# Patient Record
Sex: FEMALE | Race: WHITE | Employment: FULL TIME | URBAN - METROPOLITAN AREA
[De-identification: names, ages, dates, MRNs, and addresses within clinical notes are randomized per-mention and may not be internally consistent; named-entity substitution may affect disease eponyms.]

---

## 2024-04-16 PROCEDURE — 88304 TISSUE EXAM BY PATHOLOGIST: CPT | Performed by: PATHOLOGY

## 2024-04-16 PROCEDURE — 88304 TISSUE EXAM BY PATHOLOGIST: CPT

## 2024-04-17 ENCOUNTER — LAB REQUISITION (OUTPATIENT)
Dept: LAB | Facility: HOSPITAL | Age: 69
End: 2024-04-17
Payer: MEDICARE

## 2024-04-17 DIAGNOSIS — R22.32 LOCALIZED SWELLING, MASS AND LUMP, LEFT UPPER LIMB: ICD-10-CM

## 2024-04-18 LAB
LABORATORY COMMENT REPORT: NORMAL
PATH REPORT.FINAL DX SPEC: NORMAL
PATH REPORT.GROSS SPEC: NORMAL
PATH REPORT.RELEVANT HX SPEC: NORMAL
PATH REPORT.TOTAL CANCER: NORMAL

## 2025-02-02 ENCOUNTER — OFFICE VISIT (OUTPATIENT)
Dept: URGENT CARE | Facility: URGENT CARE | Age: 70
End: 2025-02-02
Payer: MEDICARE

## 2025-02-02 VITALS
SYSTOLIC BLOOD PRESSURE: 121 MMHG | TEMPERATURE: 98.4 F | RESPIRATION RATE: 16 BRPM | WEIGHT: 132.28 LBS | OXYGEN SATURATION: 95 % | BODY MASS INDEX: 24.22 KG/M2 | DIASTOLIC BLOOD PRESSURE: 76 MMHG | HEART RATE: 80 BPM

## 2025-02-02 DIAGNOSIS — J06.9 VIRAL URI WITH COUGH: Primary | ICD-10-CM

## 2025-02-02 DIAGNOSIS — J32.9 VIRAL SINUSITIS: ICD-10-CM

## 2025-02-02 DIAGNOSIS — H61.21 IMPACTED CERUMEN OF RIGHT EAR: ICD-10-CM

## 2025-02-02 DIAGNOSIS — R05.1 ACUTE COUGH: ICD-10-CM

## 2025-02-02 DIAGNOSIS — B97.89 VIRAL SINUSITIS: ICD-10-CM

## 2025-02-02 LAB
POC RAPID INFLUENZA A: NEGATIVE
POC RAPID INFLUENZA B: NEGATIVE
POC SARS-COV-2 AG BINAX: NORMAL

## 2025-02-02 PROCEDURE — 87811 SARS-COV-2 COVID19 W/OPTIC: CPT | Performed by: NURSE PRACTITIONER

## 2025-02-02 PROCEDURE — 99203 OFFICE O/P NEW LOW 30 MIN: CPT | Performed by: NURSE PRACTITIONER

## 2025-02-02 PROCEDURE — 1159F MED LIST DOCD IN RCRD: CPT | Performed by: NURSE PRACTITIONER

## 2025-02-02 PROCEDURE — 69209 REMOVE IMPACTED EAR WAX UNI: CPT | Performed by: NURSE PRACTITIONER

## 2025-02-02 PROCEDURE — 87804 INFLUENZA ASSAY W/OPTIC: CPT | Performed by: NURSE PRACTITIONER

## 2025-02-02 RX ORDER — FLUTICASONE PROPIONATE 50 MCG
1 SPRAY, SUSPENSION (ML) NASAL DAILY
Qty: 16 G | Refills: 0 | Status: SHIPPED | OUTPATIENT
Start: 2025-02-02 | End: 2025-02-16

## 2025-02-02 ASSESSMENT — ENCOUNTER SYMPTOMS
TROUBLE SWALLOWING: 1
FREQUENCY: 0
CHILLS: 0
DIFFICULTY URINATING: 0
ABDOMINAL PAIN: 0
DYSURIA: 0
APPETITE CHANGE: 0
VOMITING: 0
NECK STIFFNESS: 0
SHORTNESS OF BREATH: 0
WHEEZING: 0
LIGHT-HEADEDNESS: 0
MYALGIAS: 0
DIZZINESS: 0
SINUS PAIN: 0
CONFUSION: 0
COLOR CHANGE: 0
NAUSEA: 0
DIARRHEA: 0
SORE THROAT: 0
FEVER: 0
PALPITATIONS: 0
HEADACHES: 1
WEAKNESS: 0
COUGH: 1
WOUND: 0
NECK PAIN: 0
BACK PAIN: 0
CHEST TIGHTNESS: 0
SINUS PRESSURE: 0
DIAPHORESIS: 0
ACTIVITY CHANGE: 0
RHINORRHEA: 1
FATIGUE: 0

## 2025-02-02 NOTE — PROGRESS NOTES
Subjective   Patient ID: Tessy Catalan is a 69 y.o. female. They present today with a chief complaint of Cough (Coughing up a lot of green mucus cough is getting better 4 days ) and Headache (Back of head hurting pt states this happens when she gets a sinus infection 4 days ).    History of Present Illness  Chief complaint: nasal congestion/cough/trouble hearing right ear. Mild sinus HA      HPI: onset above sx 4 days. + exposure to  with +FLU A. No NVD/CP/SOB/ normal I+O. Not vaccinated for flu/covid. Drinking fluids well. Nothing for sx PTA. No fevers.   Nurses notes, vitals and old chart reviewed      History provided by:  Patient   used: No    Cough  Associated symptoms include headaches and rhinorrhea. Pertinent negatives include no chest pain, chills, ear pain, fever, myalgias, sore throat, shortness of breath or wheezing.   Headache  Associated symptoms: congestion and cough    Associated symptoms: no abdominal pain, no back pain, no diarrhea, no dizziness, no ear pain, no fatigue, no fever, no myalgias, no nausea, no neck pain, no neck stiffness, no sinus pressure, no sore throat, no vomiting and no weakness        Past Medical History  Allergies as of 02/02/2025    (No Known Allergies)       (Not in a hospital admission)       No past medical history on file.    No past surgical history on file.     reports that she has quit smoking. Her smoking use included cigarettes. She uses smokeless tobacco.    Review of Systems  Review of Systems   Constitutional:  Negative for activity change, appetite change, chills, diaphoresis, fatigue and fever.   HENT:  Positive for congestion, rhinorrhea and trouble swallowing. Negative for drooling, ear discharge, ear pain, nosebleeds, sinus pressure, sinus pain, sneezing and sore throat.         Decreae hearing right ear.    Eyes:  Negative for visual disturbance.   Respiratory:  Positive for cough. Negative for chest tightness, shortness of breath  and wheezing.    Cardiovascular:  Negative for chest pain, palpitations and leg swelling.   Gastrointestinal:  Negative for abdominal pain, diarrhea, nausea and vomiting.   Genitourinary:  Negative for decreased urine volume, difficulty urinating, dysuria, frequency and urgency.   Musculoskeletal:  Negative for back pain, myalgias, neck pain and neck stiffness.   Skin:  Negative for color change and wound.   Neurological:  Positive for headaches. Negative for dizziness, syncope, weakness and light-headedness.   Psychiatric/Behavioral:  Negative for confusion.                                   Objective    Vitals:    02/02/25 1009   BP: 121/76   BP Location: Left arm   Patient Position: Sitting   Pulse: 80   Resp: 16   Temp: 36.9 °C (98.4 °F)   TempSrc: Oral   SpO2: 95%   Weight: 60 kg (132 lb 4.4 oz)     No LMP recorded.    Physical Exam  Physical Exam  Constitutional:       General: Pateint is awake. Patient is not in acute distress.     Appearance: Normal appearance. Pateint is well-developed and well-groomed. Patient is not ill-appearing, toxic-appearing or diaphoretic.   HENT:      Head: Normocephalic and atraumatic. No right periorbital erythema or left periorbital erythema.      Jaw: There is normal jaw occlusion.      Right Ear: positve cerumen impaction. Hearing, tympanic membrane, ear canal and external ear normal. No tenderness. No mastoid tenderness.      Left Ear: Hearing, tympanic membrane, ear canal and external ear normal. No tenderness. No mastoid tenderness.      Nose: Congestion and rhinorrhea are present. Negative for frontal, ethmoid, and maxillary sinus tenderness, erythema or edema. Neg periorbital edema or erythema.      Comments:      Mouth/Throat:      Lips: Pink.      Mouth: Mucous membranes are moist. No angioedema. No sublingual or lingual edema.      Pharynx: Oropharynx is clear. Uvula midline without uvulitis. No erythema, loculated or confluent exudate. No angioedema. No PTA formation.  No petechial rash  Eyes:   PERRLA, EOMS intact     Conjunctiva/sclera: Conjunctivae normal.   Lymphatics: Neg for submandibular, submental. Neg ant or post cervical.   Cardiovascular:      Rate and Rhythm: Normal rate.  RRR.      Pulses: Normal pulses.      Heart sounds: Normal heart sounds. No MGR.  Pulmonary:      Effort: Pulmonary effort is normal.      Breath sounds: Normal breath sounds and air entry.   Musculoskeletal:   VELAZQUEZ without deficits.      Right lower leg: No edema.      Left lower leg: No edema.   NECK: NEG JVD. Full ROM with flex/ext and lateral movements.   Skin:     Capillary Refill: Capillary refill takes less than 2 seconds.   Neurological:      Mental Status: Aox4, following all commands and answering all questions briskly and appropriately.      GCS: GCS eye subscore is 4. GCS verbal subscore is 5. GCS motor subscore is 6.   Psychiatric:         Mood and Affect: Mood normal.         Behavior: Behavior normal. Behavior is cooperative.         Thought Content: Thought content normal.         Judgment: Judgment normal.   Ear Cerumen Removal    Date/Time: 2/2/2025 10:47 AM    Performed by: SAFIA Fishman  Authorized by: SAFIA Fishman    Consent:     Consent obtained:  Verbal    Consent given by:  Patient    Risks, benefits, and alternatives were discussed: yes      Risks discussed:  Bleeding, dizziness, pain, TM perforation and incomplete removal    Alternatives discussed:  Referral and no treatment  Universal protocol:     Procedure explained and questions answered to patient or proxy's satisfaction: yes      Patient identity confirmed:  Verbally with patient  Procedure details:     Location:  R ear    Procedure type: irrigation      Procedure outcomes: cerumen removed    Post-procedure details:     Inspection:  No bleeding and ear canal clear    Hearing quality:  Improved    Procedure completion:  Tolerated well, no immediate complications      Point of Care Test & Imaging  Results from this visit  Results for orders placed or performed in visit on 02/02/25   POCT Covid-19 Rapid Antigen   Result Value Ref Range    POC OBEY-COV-2 AG  Presumptive negative test for SARS-CoV-2 (no antigen detected)     Presumptive negative test for SARS-CoV-2 (no antigen detected)   POCT Influenza A/B manually resulted   Result Value Ref Range    POC Rapid Influenza A Negative Negative    POC Rapid Influenza B Negative Negative      No results found.    Diagnostic study results (if any) were reviewed by SAFIA Fishman.    Assessment/Plan   Allergies, medications, history, and pertinent labs/EKGs/Imaging reviewed by SAFIA Fishman.     Medical Decision Making  HPI:  HISTORIAN: Pateint  INDEPENDENT HISTORIAN:   RECORDS REVIEWED:  DIFFERENTIAL DX: flu/covid/viral sinusitus/viral uri/cerumen impaction vs oe vs om  LABS: all labs neg.   XRAY: pox normal with clear lungs, no films needed.   EKG:  IN CLINIC MEDICATIONS: NONE  CONSULTED WITH:  DIAGNOSIS: See urgent care course of treatment  SEE URGENT CARE COURSE OF TREATMENT AND DOCUMENTATION FOR RX MEDS GIVEN.   Ears irrigated- see procedure note.   Patient and or family were counselled on labs, radiological studies, and all testing applicable for this visit as well as diagnosis. Patient was discharged homewith stable afebrile non-toxic vital signs. They verbalized discharge instructions that were given to them BOTH written and verbally by myself.  They were given ample time to ask questionsand have none at time of disposition.    Orders and Diagnoses  Diagnoses and all orders for this visit:  Viral URI with cough  Acute cough  -     POCT Covid-19 Rapid Antigen  -     POCT Influenza A/B manually resulted  Impacted cerumen of right ear  Viral sinusitis      Medical Admin Record      Patient disposition: Home    Electronically signed by SAFIA Fishman  10:38 AM

## 2025-02-02 NOTE — PATIENT INSTRUCTIONS
Increase fluids  Eat a well balanced diet   Tylenol or motrin for fevers  Rx medications or over the counter meds as discussed or ordered  Read and follow preprinted instructions  As always you are invited to return if your condition should change or worsen in any way  If your condition worsens or becomes severe or life threatening, please go to the nearest emergency department  Follow up with your family dr in 3 days if no better.